# Patient Record
Sex: MALE | Race: WHITE | NOT HISPANIC OR LATINO | Employment: FULL TIME | URBAN - METROPOLITAN AREA
[De-identification: names, ages, dates, MRNs, and addresses within clinical notes are randomized per-mention and may not be internally consistent; named-entity substitution may affect disease eponyms.]

---

## 2017-03-23 ENCOUNTER — THERAPY VISIT (OUTPATIENT)
Dept: PHYSICAL THERAPY | Facility: CLINIC | Age: 42
End: 2017-03-23
Payer: COMMERCIAL

## 2017-03-23 DIAGNOSIS — M25.561 RIGHT KNEE PAIN: Primary | ICD-10-CM

## 2017-03-23 PROCEDURE — 97110 THERAPEUTIC EXERCISES: CPT | Mod: GP | Performed by: PHYSICAL THERAPIST

## 2017-03-23 PROCEDURE — 97161 PT EVAL LOW COMPLEX 20 MIN: CPT | Mod: GP | Performed by: PHYSICAL THERAPIST

## 2017-03-23 ASSESSMENT — ACTIVITIES OF DAILY LIVING (ADL)
LIMPING: I DO NOT HAVE THE SYMPTOM
GIVING WAY, BUCKLING OR SHIFTING OF KNEE: I DO NOT HAVE THE SYMPTOM
SQUAT: ACTIVITY IS NOT DIFFICULT
GO UP STAIRS: ACTIVITY IS NOT DIFFICULT
SWELLING: I DO NOT HAVE THE SYMPTOM
WALK: ACTIVITY IS NOT DIFFICULT
PAIN: I HAVE THE SYMPTOM BUT IT DOES NOT AFFECT MY ACTIVITY
HOW_WOULD_YOU_RATE_THE_CURRENT_FUNCTION_OF_YOUR_KNEE_DURING_YOUR_USUAL_DAILY_ACTIVITIES_ON_A_SCALE_FROM_0_TO_100_WITH_100_BEING_YOUR_LEVEL_OF_KNEE_FUNCTION_PRIOR_TO_YOUR_INJURY_AND_0_BEING_THE_INABILITY_TO_PERFORM_ANY_OF_YOUR_USUAL_DAILY_ACTIVITIES?: 85
RAW_SCORE: 64
STAND: ACTIVITY IS NOT DIFFICULT
WEAKNESS: I HAVE THE SYMPTOM BUT IT DOES NOT AFFECT MY ACTIVITY
HOW_WOULD_YOU_RATE_THE_OVERALL_FUNCTION_OF_YOUR_KNEE_DURING_YOUR_USUAL_DAILY_ACTIVITIES?: NEARLY NORMAL
KNEE_ACTIVITY_OF_DAILY_LIVING_SCORE: 91.43
KNEE_ACTIVITY_OF_DAILY_LIVING_SUM: 64
SIT WITH YOUR KNEE BENT: ACTIVITY IS MINIMALLY DIFFICULT
KNEEL ON THE FRONT OF YOUR KNEE: ACTIVITY IS MINIMALLY DIFFICULT
AS_A_RESULT_OF_YOUR_KNEE_INJURY,_HOW_WOULD_YOU_RATE_YOUR_CURRENT_LEVEL_OF_DAILY_ACTIVITY?: NEARLY NORMAL
GO DOWN STAIRS: ACTIVITY IS NOT DIFFICULT
RISE FROM A CHAIR: ACTIVITY IS MINIMALLY DIFFICULT
STIFFNESS: I HAVE THE SYMPTOM BUT IT DOES NOT AFFECT MY ACTIVITY

## 2017-03-23 NOTE — LETTER
[facility]  [address]  [address]  [place] [address]  [phone]    2017    Re: Skip Galvan   :   1975  MRN:  [id number]   REFERRING PHYSICIAN:   Lawrence Braden    [facility]    Date of Initial Evaluation: 3/23/17  Visits:  Rxs Used: 1  Reason for Referral:  Right knee pain    EVALUATION SUMMARY    [facility]    Subjective:  Skip Galvan is a 41 year old male with a right knee condition.  Condition occurred with:  Insidious onset.  Condition occurred: for unknown reasons.  This is a new condition  Patient c/o a gradual of posterior knee pain beginning about 2-3 weeks ago - 3/1/2017.  Patient c/o pain when taking first steps in the morning or after sitting for a while.  Patient denies any swelling, instability, locking, or catching.    Patient reports pain:  Posterior.    Quality: tightness. and is intermittent and reported as 2/10.  Associated symptoms:  Loss of motion/stiffness. Pain is worse in the A.M..  Symptoms are exacerbated by walking and relieved by rest and activity/movement.  Since onset symptoms are gradually improving.  Special testing: none.  Previous treatment: none.    General health as reported by patient is fair.  Pertinent medical history includes:  High blood pressure and overweight.  Medical allergies: no.  Other surgeries include:  None reported.  Current medications:  High blood pressure medication.  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  Primary job tasks include:  Prolonged sitting.  Barriers include:  None as reported by the patient.  Red flags:  None as reported by the patient.              Objective:  Standing Alignment:    Knee:  Normal  Gait:    Gait Type:  Normal     Flexibility/Screens:   Negative screens: Lumbar           Re: Skip Galvan   :   1975    Knee Evaluation:  ROM:  AROM:  normal  PROM: normal    Strength:   Extension:  Right: 5/5    Pain:-  Flexion:  Right: 5/5    Pain:+    Quad Set Right: Good    Pain:  Ligament Testing:  Normal  Palpation:  Normal  Edema:  Normal    Assessment/Plan:    Patient is a 41 year old male with right side knee complaints.    Patient has the following significant findings with corresponding treatment plan.                Diagnosis 1:  Posterior knee strain - insidious onset  Pain -  self management, education and home program  Decreased strength - therapeutic exercise, therapeutic activities and home program  Impaired muscle performance - neuro re-education and home program  Decreased function - therapeutic activities and home program    Therapy Evaluation Codes:   1) History comprised of:   Personal factors that impact the plan of care:      None.    Comorbidity factors that impact the plan of care are:      None.     Medications impacting care: High blood pressure.  2) Examination of Body Systems comprised of:   Body structures and functions that impact the plan of care:      Knee.   Activity limitations that impact the plan of care are:      Walking.  3) Clinical presentation characteristics are:   Stable/Uncomplicated.  4) Decision-Making    Low complexity using standardized patient assessment instrument and/or measureable assessment of functional outcome.  Cumulative Therapy Evaluation is: Low complexity.  Previous and current functional limitations:  (See Goal Flow Sheet for this information)    Short term and Long term goals: (See Goal Flow Sheet for this information)   Communication ability:  Patient appears to be able to clearly communicate and understand verbal and written communication and follow directions correctly.  Treatment Explanation - The following has been discussed with the patient:   RX ordered/plan of care  Anticipated outcomes  Possible risks and side effects  This patient would benefit from PT intervention to resume normal  activities.   Rehab potential is good.  Re: Skip Galvan   :   1975    Frequency:  1 X week, once daily  Duration:  for 6 weeks  Discharge Plan:  Achieve all LTG.  Independent in home treatment program.  Reach maximal therapeutic benefit.      Thank you for your referral.    INQUIRIES  Therapist: Connor Blum PT  [facility]  [address] [address]  Phone: [phone]  Fax: [phone]

## 2017-03-23 NOTE — PROGRESS NOTES
[facility] Initial Evaluation      Subjective:    Skip Galvan is a 41 year old male with a right knee condition.  Condition occurred with:  Insidious onset.  Condition occurred: for unknown reasons.  This is a new condition  Patient c/o a gradual of posterior knee pain beginning about 2-3 weeks ago - 3/1/2017.  Patient c/o pain when taking first steps in the morning or after sitting for a while.  Patient denies any swelling, instability, locking, or catching.    Patient reports pain:  Posterior.    Quality: tightness. and is intermittent and reported as 2/10.  Associated symptoms:  Loss of motion/stiffness. Pain is worse in the A.M..  Symptoms are exacerbated by walking and relieved by rest and activity/movement.  Since onset symptoms are gradually improving.  Special testing: none.  Previous treatment: none.    General health as reported by patient is fair.  Pertinent medical history includes:  High blood pressure and overweight.  Medical allergies: no.  Other surgeries include:  None reported.  Current medications:  High blood pressure medication.  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  Primary job tasks include:  Prolonged sitting.    Barriers include:  None as reported by the patient.    Red flags:  None as reported by the patient.                      Objective:    Standing Alignment:              Knee:  Normal      Gait:    Gait Type:  Normal         Flexibility/Screens:   Negative screens: Lumbar                                                           Knee Evaluation:  ROM:  AROM: normal  PROM: normal            Strength:     Extension:  Right: 5/5    Pain:-  Flexion:  Right: 5/5    Pain:+      Quad Set Right: Good    Pain:  Ligament Testing:  Normal                  Palpation:  Normal      Edema:  Normal            General     ROS    Assessment/Plan:      Patient is a 41 year old male with right side knee complaints.     Patient has the following significant findings with corresponding treatment plan.                Diagnosis 1:  Posterior knee strain - insidious onset  Pain -  self management, education and home program  Decreased strength - therapeutic exercise, therapeutic activities and home program  Impaired muscle performance - neuro re-education and home program  Decreased function - therapeutic activities and home program    Therapy Evaluation Codes:   1) History comprised of:   Personal factors that impact the plan of care:      None.    Comorbidity factors that impact the plan of care are:      None.     Medications impacting care: High blood pressure.  2) Examination of Body Systems comprised of:   Body structures and functions that impact the plan of care:      Knee.   Activity limitations that impact the plan of care are:      Walking.  3) Clinical presentation characteristics are:   Stable/Uncomplicated.  4) Decision-Making    Low complexity using standardized patient assessment instrument and/or measureable assessment of functional outcome.  Cumulative Therapy Evaluation is: Low complexity.    Previous and current functional limitations:  (See Goal Flow Sheet for this information)    Short term and Long term goals: (See Goal Flow Sheet for this information)     Communication ability:  Patient appears to be able to clearly communicate and understand verbal and written communication and follow directions correctly.  Treatment Explanation - The following has been discussed with the patient:   RX ordered/plan of care  Anticipated outcomes  Possible risks and side effects  This patient would benefit from PT intervention to resume normal activities.   Rehab potential is good.    Frequency:  1 X week, once daily  Duration:  for 6 weeks  Discharge Plan:  Achieve all LTG.  Independent in home treatment program.  Reach maximal therapeutic benefit.    Please refer to the daily flowsheet for treatment today, total treatment  time and time spent performing 1:1 timed codes.

## 2017-05-15 NOTE — PROGRESS NOTES
Subjective:    HPI       Knee Activity of Daily Living Score: 91.43            Objective:    System    Physical Exam    General     ROS    Assessment/Plan:      DISCHARGE REPORT    Progress reporting period is from 3/23/2017 to 5/15/2017.       SUBJECTIVE  Patient's current status is unknown.  He did not complete therapy as planned.  He has not returned since the initial evaluation and treatment.    OBJECTIVE  Changes noted in objective findings:  Patient has failed to return to therapy so current objective findings are unknown.        ASSESSMENT/PLAN  Updated problem list and treatment plan: Diagnosis 1:  Knee Pain    Assessment of Progress: The patient has not returned to therapy. Current status is unknown.  Self Management Plans:  Patient has been instructed in a home treatment program.    D/C PT since Skip has not returned.

## 2017-06-20 ENCOUNTER — THERAPY VISIT (OUTPATIENT)
Dept: PHYSICAL THERAPY | Facility: CLINIC | Age: 42
End: 2017-06-20
Payer: COMMERCIAL

## 2017-06-20 DIAGNOSIS — M25.552 HIP PAIN, LEFT: ICD-10-CM

## 2017-06-20 DIAGNOSIS — M25.561 KNEE PAIN, RIGHT: ICD-10-CM

## 2017-06-20 PROCEDURE — 97110 THERAPEUTIC EXERCISES: CPT | Mod: GP | Performed by: PHYSICAL THERAPIST

## 2017-06-20 PROCEDURE — 97161 PT EVAL LOW COMPLEX 20 MIN: CPT | Mod: GP | Performed by: PHYSICAL THERAPIST

## 2017-06-20 ASSESSMENT — ACTIVITIES OF DAILY LIVING (ADL)
SWELLING: I DO NOT HAVE THE SYMPTOM
KNEE_ACTIVITY_OF_DAILY_LIVING_SCORE: 78.46
GIVING WAY, BUCKLING OR SHIFTING OF KNEE: I HAVE THE SYMPTOM BUT IT DOES NOT AFFECT MY ACTIVITY
STAND: NOT ANSWERED
AS_A_RESULT_OF_YOUR_KNEE_INJURY,_HOW_WOULD_YOU_RATE_YOUR_CURRENT_LEVEL_OF_DAILY_ACTIVITY?: NOT ANSWERED
SIT WITH YOUR KNEE BENT: ACTIVITY IS NOT DIFFICULT
KNEE_ACTIVITY_OF_DAILY_LIVING_SUM: 51
HOW_WOULD_YOU_RATE_THE_OVERALL_FUNCTION_OF_YOUR_KNEE_DURING_YOUR_USUAL_DAILY_ACTIVITIES?: NOT ANSWERED
SQUAT: ACTIVITY IS MINIMALLY DIFFICULT
KNEEL ON THE FRONT OF YOUR KNEE: ACTIVITY IS SOMEWHAT DIFFICULT
GO DOWN STAIRS: ACTIVITY IS MINIMALLY DIFFICULT
RISE FROM A CHAIR: ACTIVITY IS MINIMALLY DIFFICULT
WEAKNESS: I HAVE THE SYMPTOM BUT IT DOES NOT AFFECT MY ACTIVITY
WALK: ACTIVITY IS MINIMALLY DIFFICULT
GO UP STAIRS: ACTIVITY IS MINIMALLY DIFFICULT
STIFFNESS: I HAVE THE SYMPTOM BUT IT DOES NOT AFFECT MY ACTIVITY
RAW_SCORE: 54.92
LIMPING: THE SYMPTOM AFFECTS MY ACTIVITY SLIGHTLY
HOW_WOULD_YOU_RATE_THE_CURRENT_FUNCTION_OF_YOUR_KNEE_DURING_YOUR_USUAL_DAILY_ACTIVITIES_ON_A_SCALE_FROM_0_TO_100_WITH_100_BEING_YOUR_LEVEL_OF_KNEE_FUNCTION_PRIOR_TO_YOUR_INJURY_AND_0_BEING_THE_INABILITY_TO_PERFORM_ANY_OF_YOUR_USUAL_DAILY_ACTIVITIES?: 80
PAIN: THE SYMPTOM AFFECTS MY ACTIVITY SLIGHTLY

## 2017-06-20 NOTE — PROGRESS NOTES
"Subjective:    Patient is a 41 year old male presenting with rehab right knee hpi.   Skip Galvan is a 41 year old male with a right knee (left hip) condition.      This is a new condition  Patient reports that he has been having left lateral gluteus pain.  Pain is currently 3/10, worse 6/10, best 2/10.   Having right anterior knee pain started around 3/1/17.  Slowly getting a little worse.  Associated symptoms:  Loss of motion/stiffness. Pain is worse in the A.M..  Symptoms are exacerbated by walking and relieved by rest and activity/movement.  Since onset symptoms are gradually improving.  Special testing: none.  Previous treatment: none.    General health as reported by patient is fair.  Pertinent medical history includes:  High blood pressure and overweight.  Medical allergies: no.  Other surgeries include:  None reported.  Current medications:  High blood pressure medication.  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  Primary job tasks include:  Prolonged sitting.     Barriers include:  None as reported by the patient.     Red flags:  None as reported by the patient.   .                                                                        Objective:          Flexibility/Screens:       Lower Extremity:  Decreased left lower extremity flexibility:Piriformis; Hip Flexors; Quadriceps; Hamstrings and Gastroc    Decreased right lower extremity flexibility:  Piriformis; Hip Flexors; Quadriceps; Hamstrings and Gastroc                                                      Knee Evaluation:  ROM:  Strength wnl knee: Right hip flexion 4+/5, abduction 4+/5, extension 4/5.  Fair/poor control with 6\" lateral step down on right, needs upper extremity support.    PROM        Flexion: Left:   Right:  Pain at end range a slight limitation      Strength:     Extension:  Left: 5/5   Pain:      Right: 4+/5   Pain:  Flexion:  Left: 5/5   Pain:      Right: 4+/5   Pain:  "                       General     ROS    Assessment/Plan:      Patient is a 41 year old male with left side hip and right side knee complaints.    Patient has the following significant findings with corresponding treatment plan.                Diagnosis 1:  Right knee pain  Pain -  hot/cold therapy, self management, education, directional preference exercise and home program  Decreased ROM/flexibility - manual therapy, therapeutic exercise and home program  Decreased strength - therapeutic exercise, therapeutic activities and home program  Impaired gait - gait training and home program  Impaired muscle performance - neuro re-education and home program  Decreased function - therapeutic activities and home program    Therapy Evaluation Codes:   1) History comprised of:   Personal factors that impact the plan of care:      Time since onset of symptoms.    Comorbidity factors that impact the plan of care are:      Overweight.     Medications impacting care: None.  2) Examination of Body Systems comprised of:   Body structures and functions that impact the plan of care:      Hip and Knee.   Activity limitations that impact the plan of care are:      Sitting, Squatting/kneeling, Stairs, Standing and Walking.  3) Clinical presentation characteristics are:   Stable/Uncomplicated.  4) Decision-Making    Low complexity using standardized patient assessment instrument and/or measureable assessment of functional outcome.  Cumulative Therapy Evaluation is: Low complexity.    Previous and current functional limitations:  (See Goal Flow Sheet for this information)    Short term and Long term goals: (See Goal Flow Sheet for this information)     Communication ability:  Patient appears to be able to clearly communicate and understand verbal and written communication and follow directions correctly.  Treatment Explanation - The following has been discussed with the patient:   RX ordered/plan of care  Anticipated outcomes  Possible  risks and side effects  This patient would benefit from PT intervention to resume normal activities.   Rehab potential is excellent.    Frequency:  1 X week, once daily  Duration:  for 6 weeks  Discharge Plan:  Achieve all LTG.  Independent in home treatment program.  Reach maximal therapeutic benefit.    Please refer to the daily flowsheet for treatment today, total treatment time and time spent performing 1:1 timed codes.

## 2017-06-20 NOTE — MR AVS SNAPSHOT
"              After Visit Summary   2017    Skip Galvan    MRN: [id number]           Patient Information     Date Of Birth          1975        Visit Information        Provider Department      2017 11:20 AM Gui Pugh PT [facility]        Today's Diagnoses     Hip pain, left        Knee pain, right           Follow-ups after your visit        Who to contact     If you have questions or need follow up information about today's clinic visit or your schedule please contact [facility] directly at [phone].  Normal or non-critical lab and imaging results will be communicated to you by [employer], letter or phone within 4 business days after the clinic has received the results. If you do not hear from us within 7 days, please contact the clinic through [employer])t or phone. If you have a critical or abnormal lab result, we will notify you by phone as soon as possible.  Submit refill requests through [employer] or call your pharmacy and they will forward the refill request to us. Please allow 3 business days for your refill to be completed.          Additional Information About Your Visit        MyChart Information     [employer] lets you send messages to your doctor, view your test results, renew your prescriptions, schedule appointments and more. To sign up, go to [email or web address] . Click on \"Log in\" on the left side of the screen, which will take you to the Welcome page. Then click on \"Sign up Now\" on the right side of the page.     You will be asked to enter the access code listed below, as well as some personal information. Please follow the directions to create your username and password.     Your access code is: [email or web address]  Expires: 2017 12:31 PM     Your access code will  in 90 days. If you need help or a new code, please call your [place] clinic or [phone].        Care EveryWhere ID     This is your Care EveryWhere ID. " This could be used by other organizations to access your [place] medical records  [id number]         Blood Pressure from Last 3 Encounters:   08/15/16 (!) 146/98   09/12/11 128/84   03/18/10 140/94    Weight from Last 3 Encounters:   09/12/11 100.6 kg (221 lb 12.8 oz)   11/15/08 108 kg (238 lb)   01/03/06 101.2 kg (223 lb)              We Performed the Following     HC PT EVAL, LOW COMPLEXITY     LISE INITIAL EVAL REPORT     THERAPEUTIC EXERCISES        Primary Care Provider    None Specified       No primary provider on file.        Thank you!     Thank you for choosing [facility]  for your care. Our goal is always to provide you with excellent care. Hearing back from our patients is one way we can continue to improve our services. Please take a few minutes to complete the written survey that you may receive in the mail after your visit with us. Thank you!             Your Updated Medication List - Protect others around you: Learn how to safely use, store and throw away your medicines at www.disposemymeds.org.          This list is accurate as of: 6/20/17 12:31 PM.  Always use your most recent med list.                   Brand Name Dispense Instructions for use    lisinopril 10 MG tablet    PRINIVIL/ZESTRIL    90 tablet    Take 1 tablet (10 mg) by mouth daily       Multi-vitamin Tabs tablet     100 tablet    Take 1 tablet by mouth daily

## 2017-06-20 NOTE — LETTER
[facility]  [address]  [address]  [place] [address]  [phone]    2017    Re: Skip Galvan   :   1975  MRN:  [id number]   REFERRING PHYSICIAN:   Lawrence Braden    [facility] [facility]    Date of Initial Evaluation:  17  Visits:  Rxs Used: 1  Reason for Referral:     Hip pain, left  Knee pain, right    EVALUATION SUMMARY    Subjective:  Patient is a 41 year old male presenting with rehab right knee hpi.   Skip Galvan is a 41 year old male with a right knee (left hip) condition.      This is a new condition  Patient reports that he has been having left lateral gluteus pain.  Pain is currently 3/10, worse 6/10, best 2/10.   Having right anterior knee pain started around 3/1/17.  Slowly getting a little worse.  Associated symptoms:  Loss of motion/stiffness. Pain is worse in the A.M..  Symptoms are exacerbated by walking and relieved by rest and activity/movement.  Since onset symptoms are gradually improving.  Special testing: none.  Previous treatment: none.    General health as reported by patient is fair.  Pertinent medical history includes:  High blood pressure and overweight.  Medical allergies: no.  Other surgeries include:  None reported.  Current medications:  High blood pressure medication.  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  Primary job tasks include:  Prolonged sitting.   Barriers include:  None as reported by the patient.   Red flags:  None as reported by the patient.                                             Objective:  Flexibility/Screens:   Lower Extremity:  Decreased left lower extremity flexibility:Piriformis; Hip Flexors; Quadriceps; Hamstrings and Gastroc  Decreased right lower extremity flexibility:  Piriformis; Hip Flexors; Quadriceps; Hamstrings and Gastroc       Knee Evaluation:  ROM:  Strength wnl knee: Right hip flexion 4+/5, abduction 4+/5,  "extension 4/5.  Fair/poor control with 6\" lateral step down on right, needs upper extremity support.  Re: Skip Galvan   :   1975    PROM  Flexion: Left:   Right:  Pain at end range a slight limitation  Strength:   Extension:  Left: 5/5   Pain:      Right: 4+/5   Pain:  Flexion:  Left: 5/5   Pain:      Right: 4+/5   Pain:      Assessment/Plan:    Patient is a 41 year old male with left side hip and right side knee complaints.    Patient has the following significant findings with corresponding treatment plan.                Diagnosis 1:  Right knee pain  Pain -  hot/cold therapy, self management, education, directional preference exercise and home program  Decreased ROM/flexibility - manual therapy, therapeutic exercise and home program  Decreased strength - therapeutic exercise, therapeutic activities and home program  Impaired gait - gait training and home program  Impaired muscle performance - neuro re-education and home program  Decreased function - therapeutic activities and home program    Therapy Evaluation Codes:   1) History comprised of:   Personal factors that impact the plan of care:      Time since onset of symptoms.    Comorbidity factors that impact the plan of care are:      Overweight.     Medications impacting care: None.  2) Examination of Body Systems comprised of:   Body structures and functions that impact the plan of care:      Hip and Knee.   Activity limitations that impact the plan of care are:      Sitting, Squatting/kneeling, Stairs, Standing and Walking.  3) Clinical presentation characteristics are:   Stable/Uncomplicated.  4) Decision-Making    Low complexity using standardized patient assessment instrument and/or measureable assessment of functional outcome.  Cumulative Therapy Evaluation is: Low complexity.  Previous and current functional limitations:  (See Goal Flow Sheet for this information)    Short term and Long term goals: (See Goal Flow Sheet for this information) "   Communication ability:  Patient appears to be able to clearly communicate and understand verbal and written communication and follow directions correctly.  Treatment Explanation - The following has been discussed with the patient:   RX ordered/plan of care  Anticipated outcomes  Possible risks and side effects  This patient would benefit from PT intervention to resume normal activities.   Rehab potential is excellent.  Frequency:  1 X week, once daily  Duration:  for 6 weeks  Re: Skip Galvan   :   1975    Discharge Plan:  Achieve all LTG.  Independent in home treatment program.  Reach maximal therapeutic benefit.    Thank you for your referral.    INQUIRIES  Therapist: Gui Pugh PT  [facility]  [address] [place] [address]  Phone: [phone]  Fax: [phone]

## 2017-09-19 PROBLEM — M25.561 KNEE PAIN, RIGHT: Status: RESOLVED | Noted: 2017-06-20 | Resolved: 2017-09-19

## 2017-09-19 PROBLEM — M25.552 HIP PAIN, LEFT: Status: RESOLVED | Noted: 2017-06-20 | Resolved: 2017-09-19

## 2022-08-02 ENCOUNTER — HOSPITAL ENCOUNTER (EMERGENCY)
Facility: CLINIC | Age: 47
Discharge: HOME | End: 2022-08-03
Attending: EMERGENCY MEDICINE | Admitting: EMERGENCY MEDICINE
Payer: COMMERCIAL

## 2022-08-02 DIAGNOSIS — I26.99 BILATERAL PULMONARY EMBOLISM (H): Primary | ICD-10-CM

## 2022-08-02 DIAGNOSIS — M54.6 ACUTE RIGHT-SIDED THORACIC BACK PAIN: ICD-10-CM

## 2022-08-02 LAB
ALBUMIN UR-MCNC: NEGATIVE MG/DL
ANION GAP SERPL CALCULATED.3IONS-SCNC: 9 MMOL/L (ref 7–15)
APPEARANCE UR: CLEAR
BILIRUB UR QL STRIP: NEGATIVE
BUN SERPL-MCNC: 15.7 MG/DL (ref 6–20)
CALCIUM SERPL-MCNC: 8.6 MG/DL (ref 8.6–10)
CHLORIDE SERPL-SCNC: 101 MMOL/L (ref 98–107)
COLOR UR AUTO: YELLOW
CREAT SERPL-MCNC: 0.71 MG/DL (ref 0.67–1.17)
D DIMER PPP FEU-MCNC: 1.24 UG/ML FEU (ref 0–0.5)
DEPRECATED HCO3 PLAS-SCNC: 27 MMOL/L (ref 22–29)
ERYTHROCYTE [DISTWIDTH] IN BLOOD BY AUTOMATED COUNT: 12.2 % (ref 10–15)
GFR SERPL CREATININE-BSD FRML MDRD: >90 ML/MIN/1.73M2
GLUCOSE SERPL-MCNC: 119 MG/DL (ref 70–99)
GLUCOSE UR STRIP-MCNC: NEGATIVE MG/DL
HCT VFR BLD AUTO: 35 % (ref 40–53)
HGB BLD-MCNC: 11.5 G/DL (ref 13.3–17.7)
HGB UR QL STRIP: ABNORMAL
HOLD SPECIMEN: NORMAL
KETONES UR STRIP-MCNC: NEGATIVE MG/DL
LEUKOCYTE ESTERASE UR QL STRIP: NEGATIVE
MCH RBC QN AUTO: 30.8 PG (ref 26.5–33)
MCHC RBC AUTO-ENTMCNC: 32.9 G/DL (ref 31.5–36.5)
MCV RBC AUTO: 94 FL (ref 78–100)
MUCOUS THREADS #/AREA URNS LPF: PRESENT /LPF
NITRATE UR QL: NEGATIVE
PH UR STRIP: 6 [PH] (ref 5–7)
PLATELET # BLD AUTO: 216 10E3/UL (ref 150–450)
POTASSIUM SERPL-SCNC: 3.5 MMOL/L (ref 3.4–5.3)
RBC # BLD AUTO: 3.73 10E6/UL (ref 4.4–5.9)
RBC URINE: 6 /HPF
SODIUM SERPL-SCNC: 137 MMOL/L (ref 136–145)
SP GR UR STRIP: 1.02 (ref 1–1.03)
SQUAMOUS EPITHELIAL: <1 /HPF
TROPONIN T SERPL HS-MCNC: 6 NG/L
UROBILINOGEN UR STRIP-MCNC: NORMAL MG/DL
WBC # BLD AUTO: 11.2 10E3/UL (ref 4–11)
WBC URINE: <1 /HPF

## 2022-08-02 PROCEDURE — 96360 HYDRATION IV INFUSION INIT: CPT | Mod: 25

## 2022-08-02 PROCEDURE — 85027 COMPLETE CBC AUTOMATED: CPT | Performed by: EMERGENCY MEDICINE

## 2022-08-02 PROCEDURE — 82248 BILIRUBIN DIRECT: CPT | Performed by: EMERGENCY MEDICINE

## 2022-08-02 PROCEDURE — 80048 BASIC METABOLIC PNL TOTAL CA: CPT | Performed by: EMERGENCY MEDICINE

## 2022-08-02 PROCEDURE — 84484 ASSAY OF TROPONIN QUANT: CPT | Performed by: EMERGENCY MEDICINE

## 2022-08-02 PROCEDURE — 81001 URINALYSIS AUTO W/SCOPE: CPT | Performed by: EMERGENCY MEDICINE

## 2022-08-02 PROCEDURE — 93005 ELECTROCARDIOGRAM TRACING: CPT

## 2022-08-02 PROCEDURE — 99285 EMERGENCY DEPT VISIT HI MDM: CPT | Mod: 25

## 2022-08-02 PROCEDURE — 36415 COLL VENOUS BLD VENIPUNCTURE: CPT | Performed by: EMERGENCY MEDICINE

## 2022-08-02 PROCEDURE — 82310 ASSAY OF CALCIUM: CPT | Performed by: EMERGENCY MEDICINE

## 2022-08-02 PROCEDURE — 85379 FIBRIN DEGRADATION QUANT: CPT | Performed by: EMERGENCY MEDICINE

## 2022-08-02 ASSESSMENT — ENCOUNTER SYMPTOMS
WEAKNESS: 0
COUGH: 0
HEMATURIA: 0
SHORTNESS OF BREATH: 0
DIARRHEA: 0
BACK PAIN: 1
VOMITING: 0
NUMBNESS: 0
BLOOD IN STOOL: 0

## 2022-08-03 ENCOUNTER — NURSE TRIAGE (OUTPATIENT)
Dept: NURSING | Facility: CLINIC | Age: 47
End: 2022-08-03

## 2022-08-03 ENCOUNTER — APPOINTMENT (OUTPATIENT)
Dept: CT IMAGING | Facility: CLINIC | Age: 47
End: 2022-08-03
Attending: EMERGENCY MEDICINE
Payer: COMMERCIAL

## 2022-08-03 VITALS
HEART RATE: 84 BPM | BODY MASS INDEX: 43.32 KG/M2 | HEIGHT: 65 IN | TEMPERATURE: 98.3 F | RESPIRATION RATE: 18 BRPM | WEIGHT: 260 LBS | OXYGEN SATURATION: 100 % | DIASTOLIC BLOOD PRESSURE: 75 MMHG | SYSTOLIC BLOOD PRESSURE: 135 MMHG

## 2022-08-03 LAB
ALBUMIN SERPL BCG-MCNC: 3.9 G/DL (ref 3.5–5.2)
ALP SERPL-CCNC: 79 U/L (ref 40–129)
ALT SERPL W P-5'-P-CCNC: 23 U/L (ref 10–50)
AST SERPL W P-5'-P-CCNC: 22 U/L (ref 10–50)
ATRIAL RATE - MUSE: 84 BPM
BILIRUB DIRECT SERPL-MCNC: <0.2 MG/DL (ref 0–0.3)
BILIRUB SERPL-MCNC: 0.9 MG/DL
DIASTOLIC BLOOD PRESSURE - MUSE: NORMAL MMHG
INTERPRETATION ECG - MUSE: NORMAL
P AXIS - MUSE: 38 DEGREES
PR INTERVAL - MUSE: 200 MS
PROT SERPL-MCNC: 6.9 G/DL (ref 6.4–8.3)
QRS DURATION - MUSE: 110 MS
QT - MUSE: 370 MS
QTC - MUSE: 437 MS
R AXIS - MUSE: -16 DEGREES
RADIOLOGIST FLAGS: ABNORMAL
SYSTOLIC BLOOD PRESSURE - MUSE: NORMAL MMHG
T AXIS - MUSE: 21 DEGREES
VENTRICULAR RATE- MUSE: 84 BPM

## 2022-08-03 PROCEDURE — 250N000011 HC RX IP 250 OP 636: Performed by: EMERGENCY MEDICINE

## 2022-08-03 PROCEDURE — 74177 CT ABD & PELVIS W/CONTRAST: CPT

## 2022-08-03 PROCEDURE — 258N000003 HC RX IP 258 OP 636: Performed by: EMERGENCY MEDICINE

## 2022-08-03 RX ORDER — IOPAMIDOL 755 MG/ML
500 INJECTION, SOLUTION INTRAVASCULAR ONCE
Status: COMPLETED | OUTPATIENT
Start: 2022-08-03 | End: 2022-08-03

## 2022-08-03 RX ORDER — APIXABAN 5 MG (74)
KIT ORAL
Qty: 74 EACH | Refills: 0 | Status: SHIPPED | OUTPATIENT
Start: 2022-08-03 | End: 2022-09-02

## 2022-08-03 RX ADMIN — IOPAMIDOL 70 ML: 755 INJECTION, SOLUTION INTRAVENOUS at 00:19

## 2022-08-03 RX ADMIN — SODIUM CHLORIDE 59 ML: 9 INJECTION, SOLUTION INTRAVENOUS at 00:19

## 2022-08-03 NOTE — ED PROVIDER NOTES
Signed out at shift change.  Refer to the previous note for details.  At this point is pending CT chest abdomen pelvis with PE protocol.    CT Chest (PE) Abdomen Pelvis w Contrast   Final Result   Abnormal   IMPRESSION:   1.  Acute bilateral lower lobe pulmonary emboli, right greater than left, with evidence of developing pulmonary infarction in the right lower lobe basal segments.      2.  No hydronephrosis or obstructing ureteral stone. No cause for patient's hematuria is evident.      3.  Colonic diverticulosis.         [Critical Result: Pulmonary embolism]      Finding was identified on 8/3/2022 12:36 AM.       1.  Dr. Baldwin was contacted by me on 8/3/2022 12:45 AM and verbalized understanding of the critical result.  Pulmonary embolism        12:45am I was contacted by radiology.  Labs and vitals reviewed.  Patient updated, is in no distress.  PESI score 54, Class 1.  Appropriate for outpatient management.  Eliquis prescribed.  Return immediately for head injury or uncontrolled bleeding.  Avoid NSAIDs.     Zulema Baldwin MD  08/03/22 [id number]

## 2022-08-03 NOTE — ED PROVIDER NOTES
"  History   Chief Complaint:  Back Pain       HPI   Skip Galvan is a 46 year old male who presents with non radiating worsening mid back pain for the last 2 days. He denies trauma to the area and has been taking ibuprofen with some relief to his back pain. He denies chest pain, shortness of breath, coughing, vomiting, diarrhea, blood in stool or urine, weakness, and new numbness. He notes driving long distance recently for a road trip and has not had leg swelling. He endorses moderate alcohol use but denies use of tobacco or street drugs. He denies history of heart disease, lung disease, and blood clots but notes that his father and uncle had heart attacks around the age of 65.    Review of Systems   Respiratory: Negative for cough and shortness of breath.    Cardiovascular: Negative for chest pain and leg swelling.   Gastrointestinal: Negative for blood in stool, diarrhea and vomiting.   Genitourinary: Negative for hematuria.   Musculoskeletal: Positive for back pain.   Neurological: Negative for weakness and numbness.   All other systems reviewed and are negative.    Allergies:  No known drug allergies     Medications:  Zestril  Lisinopril   hydrochlorothiazide     Past Medical History:     Hypertension  Hypercholesteremia   Diverticulitis  Obesity     Family History:    Mother: cancer    Social History:  The patient presents to the ED alone     Physical Exam     Patient Vitals for the past 24 hrs:   BP Temp Temp src Pulse Resp SpO2 Height Weight   08/02/22 2154 (!) 133/90 -- -- 87 -- 97 % -- --   08/02/22 1916 (!) 151/92 98.3  F (36.8  C) Temporal 86 20 96 % 1.651 m (5' 5\") 117.9 kg (260 lb)       Physical Exam  General: Resting on the bed.  Head: No obvious trauma to head.  Ears, Nose, Throat:  External ears normal.  Nose normal.    Eyes:  Conjunctivae clear.  Pupils are equal, round, and reactive.   CV: Regular rate and rhythm.  No murmurs.      Respiratory: Effort normal and breath sounds normal.  No " wheezing or crackles.   Gastrointestinal: Soft.  No distension. There is no tenderness.  There is no rigidity, no rebound and no guarding.   Musculoskeletal: Right posterior thoracic discomfort to palpation along the costal margin.  No sam CVA tenderness.  No step-off or deformity to the lumbar thoracic spine.  Neuro: Alert. Moving all extremities appropriately.  Normal speech.  No saddle anesthesia.  5-5 lower extremity strength.  Sensation intact to light touch.  Skin: Skin is warm and dry.  No rash noted.     Emergency Department Course   ECG  ECG taken at 1933, ECG read at 1937  Normal sinus rhythm  Normal ECG   Rate 84 bpm. VT interval 200 ms. QRS duration 110 ms. QT/QTc 370/437 ms. P-R-T axes 38 -16 21.    Imaging:  CT Chest (PE) Abdomen Pelvis w Contrast    (Results Pending)       Laboratory:  Labs Ordered and Resulted from Time of ED Arrival to Time of ED Departure   CBC WITH PLATELETS - Abnormal       Result Value    WBC Count 11.2 (*)     RBC Count 3.73 (*)     Hemoglobin 11.5 (*)     Hematocrit 35.0 (*)     MCV 94      MCH 30.8      MCHC 32.9      RDW 12.2      Platelet Count 216     BASIC METABOLIC PANEL - Abnormal    Creatinine 0.71      Sodium 137      Potassium 3.5      Urea Nitrogen 15.7      Chloride 101      Carbon Dioxide (CO2) 27      Anion Gap 9      Glucose 119 (*)     GFR Estimate >90      Calcium 8.6     D DIMER QUANTITATIVE - Abnormal    D-Dimer Quantitative 1.24 (*)    ROUTINE UA WITH MICROSCOPIC REFLEX TO CULTURE - Abnormal    Color Urine Yellow      Appearance Urine Clear      Glucose Urine Negative      Bilirubin Urine Negative      Ketones Urine Negative      Specific Gravity Urine 1.022      Blood Urine Trace (*)     pH Urine 6.0      Protein Albumin Urine Negative      Urobilinogen Urine Normal      Nitrite Urine Negative      Leukocyte Esterase Urine Negative      Mucus Urine Present (*)     RBC Urine 6 (*)     WBC Urine <1      Squamous Epithelials Urine <1     TROPONIN T, HIGH  SENSITIVITY - Normal    Troponin T, High Sensitivity 6     HEPATIC FUNCTION PANEL      Emergency Department Course:  Reviewed:  I reviewed nursing notes, vitals, past medical history and Care Everywhere    Assessments:   I obtained history and examined the patient as noted above.    I rechecked the patient and explained findings.      Disposition:  Care of the patient was transferred to my colleague  pending CT scan .     Impression & Plan   Medical Decision Makin-year-old male presents with mid thoracic back pain.  Vital signs are reassuring.  Broad differential was pursued including not limited to PE, ACS, arrhythmia, pneumonia, pneumothorax, effusion, electrolyte, metabolic, renal dysfunction, nephrolithiasis, pyelonephritis, fracture dislocation, etc.  Overall patient is well-appearing nontoxic.  CBC with mild leukocytosis and mild anemia but no other acute findings.  BMP without acute electrolyte, metabolic, renal dysfunction.  Considered PE given pleuritic pain, recent long travel, dimer was obtained.  Unfortunately dimer was positive prompting CT scan.  CT pending.  UA does have small amount of blood concerning for possible stone.  Given patient having some irregular bowel movements opted to obtain CT chest abdomen pelvis to rule out PE as well as look for any other acute intra-abdominal pathology.  No other red flags to indicate emergent MRI.  Do not suspect epidural abscess, hematoma, etc.  No fall or trauma to indicate fracture or dislocation.  Patient was signed out to my partner pending CT scan.  If CT negative anticipate home.  Pain controlled.  Patient does not require additional pain medication in the ER.  Patient stable on transfer of care.      Diagnosis:    ICD-10-CM    1. Acute right-sided thoracic back pain  M54.6        Discharge Medications:  New Prescriptions    No medications on file       Scribe Disclosure:  John LEMOS, am serving as a scribe at 9:20 PM on 2022 to  document services personally performed by Jessica Cooper MD based on my observations and the provider's statements to me.     Jessica Cooper MD  08/03/22 0007     Valtrex Counseling: I discussed with the patient the risks of valacyclovir including but not limited to kidney damage, nausea, vomiting and severe allergy.  The patient understands that if the infection seems to be worsening or is not improving, they are to call.

## 2022-08-03 NOTE — DISCHARGE INSTRUCTIONS
Please see your PCP in 2-3 days for a recheck.  If you have increasing pain, loss of bowel or bladder function, numbness in your groin, unable to walk, fevers >101 or other acute changes, return to the ED.      Avoid ibuprofen/motrin/advil due to increased risk of bleeding.  Tylenol is okay to take.  Return immediately after a head injury or uncontrolled bleeding.    Discharge Instructions  Back Pain  You were seen today for back pain. Back pain can have many causes, but most will get better without surgery or other specific treatment. Sometimes there is a herniated ( slipped ) disc. We do not usually do MRI scans to look for these right away, since most herniated discs will get better on their own with time.  Today, we did not find any evidence that your back pain was caused by a serious condition. However, sometimes symptoms develop over time and cannot be found during an emergency visit, so it is very important that you follow up with your primary provider.  Generally, every Emergency Department visit should have a follow-up clinic visit with either a primary or a specialty clinic/provider. Please follow-up as instructed by your emergency provider today.    Return to the Emergency Department if:  You develop a fever with your back pain.   You have weakness or change in sensation in one or both legs.  You lose control of your bowels or bladder, or cannot empty your bladder (cannot pee).  Your pain gets much worse.     Follow-up with your provider:  Unless your pain has completely gone away, please make an appointment with your provider within one week. Most of the routine care for back pain is available in a clinic and not the Emergency Department. You may need further management of your back pain, such as more pain medication, imaging such as an X-ray or MRI, or physical therapy.    What can I do to help myself?  Remain Active -- People are often afraid that they will hurt their back further or delay recovery by  remaining active, but this is one of the best things you can do for your back. In fact, staying in bed for a long time to rest is not recommended. Studies have shown that people with low back pain recover faster when they remain active. Movement helps to bring blood flow to the muscles and relieve muscle spasms as well as preventing loss of muscle strength.  Heat -- Using a heating pad can help with low back pain during the first few weeks. Do not sleep with a heating pad, as you can be burned.   Pain medications - You may take a pain medication such as Tylenol  (acetaminophen), Advil , Motrin  (ibuprofen) or Aleve  (naproxen).  If you were given a prescription for medicine here today, be sure to read all of the information (including the package insert) that comes with your prescription.  This will include important information about the medicine, its side effects, and any warnings that you need to know about.  The pharmacist who fills the prescription can provide more information and answer questions you may have about the medicine.  If you have questions or concerns that the pharmacist cannot address, please call or return to the Emergency Department.   Remember that you can always come back to the Emergency Department if you are not able to see your regular provider in the amount of time listed above, if you get any new symptoms, or if there is anything that worries you.

## 2022-08-03 NOTE — ED NOTES
Note deleted d/t patient not being admitted. VSS on RA. Up ab cookie. A&OX4. Pleasant and cooperative. IV removed.

## 2022-08-04 NOTE — TELEPHONE ENCOUNTER
Triage Call:    Patient and his mother calling to report patient coughing up blood.  Patient was diagnosed with bilateral PE yesterday.  He now has an oral temperature of 100.6, oxygen level at rest is 92%, difficulty breathing with activity, headache, back pain, and chest pain with deep breaths.  Patient was treated with 500 mg acetaminophen at 2130.  He reports a dime size of blood in sputum.  Reviewed AVS with patient and it advises to call 911 if coughing up blood. Patient is less than 10 minutes from [facility] ED and advised they will call EMS if any increase in symptoms.    Sweta Ramírez RN  08/03/22 10:17 PM  [facility] Nurse Advisor    Reason for Disposition    Health Information question, no triage required and triager able to answer question    Additional Information    Negative: [1] Caller is not with the adult (patient) AND [2] reporting urgent symptoms    Negative: Lab result questions    Negative: Medication questions    Negative: Caller can't be reached by phone    Negative: Caller has already spoken to PCP or another triager    Negative: RN needs further essential information from caller in order to complete triage    Negative: Requesting regular office appointment    Negative: [1] Caller requesting NON-URGENT health information AND [2] PCP's office is the best resource    Protocols used: INFORMATION ONLY CALL - NO TRIAGE-[facility]

## 2023-07-19 NOTE — ED TRIAGE NOTES
Detail Level: Zone
Pt arrives to ED via EMS after having increased back pain for the last two days. Pt was picked up at work. Pain is isolated to R mid back. Pt denies trauma. IV and 15 mg toradol placed by EMS. CMS intact. Pt states pain is worse with deep breaths.       
Detail Level: Generalized
Detail Level: Detailed